# Patient Record
Sex: FEMALE | Race: WHITE | Employment: STUDENT | URBAN - METROPOLITAN AREA
[De-identification: names, ages, dates, MRNs, and addresses within clinical notes are randomized per-mention and may not be internally consistent; named-entity substitution may affect disease eponyms.]

---

## 2020-09-28 ENCOUNTER — HOSPITAL ENCOUNTER (OUTPATIENT)
Dept: PHYSICAL THERAPY | Age: 20
Discharge: HOME | End: 2020-09-28
Payer: COMMERCIAL

## 2020-09-28 PROCEDURE — 97110 THERAPEUTIC EXERCISES: CPT

## 2020-09-28 PROCEDURE — 97161 PT EVAL LOW COMPLEX 20 MIN: CPT

## 2020-09-28 NOTE — PROGRESS NOTES
Kike Conley  : 2000  Primary: [facility]*  Secondary:  [facility]  [facility] [address], [place] [address]  [phone]   [phone]      OUTPATIENT PHYSICAL THERAPY: Daily Treatment Note 2020   Visit Count:  1  ICD-10: Treatment Diagnosis: R10.2 Pelvic and perineal pain, R27.8 Lack of coordination (muscle incoordination)  Precautions/Allergies:   Patient has no allergy information on record. TREATMENT PLAN:  Effective Dates: 2020 TO 2020 (90 days). Frequency/Duration: 1 time a week for 90 Day(s) MEDICAL/REFERRING DIAGNOSIS:  Unspecified dyspareunia [N94.10]  Pelvic floor dysfunction in female [M62.89]   DATE OF ONSET: chronic  REFERRING PHYSICIAN: Tejinder Chilel NP MD Orders: Evaluate and treat  Return MD Appointment: not scheduled     Pre-treatment Symptoms/Complaints:  PFD, pelvic pain  Pain: Initial: Pain Intensity 1: 3  Post Session:  3/10   Medications Last Reviewed:  2020  Updated Objective Findings:  See evaluation note from today   TREATMENT:   THERAPEUTIC EXERCISE: (10 minutes):  Exercises per grid below to improve mobility and coordination. Required moderate verbal and tactile cues to promote proper body breathing techniques and mm relaxation. Progressed range and repetitions as indicated. TE= therapeutic exercise, TA= therapeutic activity, MT= manual therapy, NE= neuro re-education   Date:  20 Date:   Date:     Activity/Exercise Parameters Parameters Parameters   PFM coordination PFM drops to improve coordination and ROM- 5 minutes     Diaphragmatic breathing PNS activation and PFM ROM- 5 minutes                                     Pt gives verbal consent to internal vaginal assessment/treatment without chaperon present.     Treatment/Session Summary:    · Response to Treatment:  Pt reports good understanding of plan of care, as well as prescribed home exercise program.  All questions were answered to pt's satisfaction. Pt was invited to call with any further questions or concerns. · Communication/Consultation:  None today  · Equipment provided today:  None today  · Recommendations/Intent for next treatment session: Next visit will focus on manual therapy, biofeedback, flexibility.   Total Treatment Billable Duration: Evaluation 40 minutes, treatment 10 minutes  PT Patient Time In/Time Out  Time In: 1300  Time Out: [address] Nix, PT, DPT    Future Appointments   Date Time Provider Manda Gutierrez   10/5/2020  1:00 PM Coni King, PT, DPT [facility]   10/12/2020  1:00 PM [facility]ixMisty, PT, [facility]   10/19/2020  1:00 PM NixMisty, PT, DPT [facility]   10/26/2020  1:00 PM YenxMisty, PT, DPT [facility]   11/2/2020  1:00 PM Cnoi King, PT, DPT [facility]   11/9/2020  1:00 PM Coni King, PT, DPT [facility]   11/16/2020  1:00 PM [facility] King PT, [facility]   11/30/2020  1:00 PM NixMisty, PT, DPT [facility]

## 2020-09-28 NOTE — THERAPY EVALUATION
Kike Conley  : 2000  Primary: [facility]*  Secondary:  [facility] at [facility]  [facility] [address], [place] [address]  [phone]   [phone]        OUTPATIENT PHYSICAL THERAPY:Initial Assessment 2020   ICD-10: Treatment Diagnosis: R10.2 Pelvic and perineal pain, R27.8 Lack of coordination (muscle incoordination)  Precautions/Allergies:   Patient has no allergy information on record. TREATMENT PLAN:  Effective Dates: 2020 TO 2020 (90 days). Frequency/Duration: 1 time a week for 90 Day(s) MEDICAL/REFERRING DIAGNOSIS:  Unspecified dyspareunia [N94.10]  Pelvic floor dysfunction in female [M62.89]   DATE OF ONSET: chronic  REFERRING PHYSICIAN: Tejinder Chilel NP MD Orders: Evaluate and treat  Return MD Appointment: not scheduled     INITIAL ASSESSMENT:  Ms. Tanja Cameron presents with significant pelvic floor muscle (PFM) over activity and lack of coordination, resulting in pain with intercourse, tampon use and speculum examinations. Concordant pain with palpation of PFM at all layers, with improvements in pain intensity with strain/counterstrain techniques. I believe she will benefit from skilled PT, with an emphasis on manual therapy and muscle retraining, coordination and sexual health education to decreased pain and improve her ability to participate in intercourse and GYN exams. She is pleasant, motivated and eager to improve. PROBLEM LIST (Impacting functional limitations):  1. Increased Pain  2. Decreased Flexibility/Joint Mobility  3. Decreased [place] with Home Exercise Program  4. Decreased timing, coordination and awareness INTERVENTIONS PLANNED: (Treatment may consist of any combination of the following)  1. Neuromuscular re-education  2. Biofeedback as needed  3. HEP  4. Bladder retraining  5. Bladder education  6. Heat  7. Home Exercise Program (HEP)  8. Manual Therapy  9.  Neuromuscular Re-education/Strengthening  10. Therapeutic Activites  11. Therapeutic Exercise/Strengthening     GOALS: (Goals have been discussed and agreed upon with patient.)  Short-Term Functional Goals: Time Frame: 6 weeks  1. Pt will demonstrate I with basic PFM HEP to improve awareness, coordination, and timing of PFM. 2. Pt with demonstrate normal voluntary relaxation of the pelvic floor muscle group to improve pelvic floor ROM and tolerate pain free PFM examination. 3. Pt will demonstrate ability to perform diaphragmatic breathing in multiple positions to assist in pelvic floor tension reduction  Discharge Goals: Time Frame: 12 weeks  1. Pt will be independent in a home dilator and/or graded exposure program, to be performed daily, in order to reduce pain and improved tolerance of tampon use, gynecological screening, and/or sexual intercourse. 2. Pt will reports decrease pain intensity by 50% with intercourse. 3. Pt will demonstrate independence with a home exercise program for aerobic conditioning, core stabilization, and general mobility for independent management of symptoms. OUTCOME MEASURE:   Tool Used: Pelvic Floor Impact Questionnaire--short form 7 (PFIQ-7)   Score:  Initial: 9/28/20  · Bladder or Urine: 0/100  · Bowel or Rectum: 0/100  · Vagina or Pelvis: 29/100 Most Recent: X (Date: -- )  · Bladder or Urine: X  · Bowel or Rectum: X  · Vagina or Pelvis: X   Interpretation of Score: Each of the 7 sections is scored on a scale from 0-3; 0 representing \"Not at all\", 3 representing \"Quite a bit\". The mean value is taken from all the answered items, then multiplied by 100 to obtain the scale score, ranging from 0-100. This process is repeated for each column representing bowel, bladder, and pelvic pain. MEDICAL NECESSITY:   · Patient is expected to demonstrate progress in range of motion, coordination and functional technique to decrease pain.   REASON FOR SERVICES/OTHER COMMENTS:  · Patient continues to require skilled intervention due to above mentioned deficits. Total Duration:  PT Patient Time In/Time Out  Time In: 1300  Time Out: 1400    Rehabilitation Potential For Stated Goals: Good  Regarding Lisseth Wilson's therapy, I certify that the treatment plan above will be carried out by a therapist or under their direction. Thank you for this referral,  Sam Morales, PT, DPT     Referring Physician Signature: Justen Worrell NP No Signature is Required for this note. PAIN/SUBJECTIVE:   Initial: Pain Intensity 1: 3  Post Session:  3/10   HISTORY:   History of Injury/Illness (Reason for Referral):  Ms. Janna Gallegos is a 22 yo female referred to pelvic floor physical therapy (PFPT) by Justen Worrell NP 2/2 dyspareunia and pelvic floor muscle dysfunction. Pt reports that symptoms began at the age of 25. She reports a history of pain with intercourse, tampon use and speculum exams. She initially attributed pain to anxiety. She states that she has been with her current partner for some time and continues to have pain. She does have an IUD and was concerned that this might be the source of her pain. She states that she had this checked by her GYN and had STI testing as well. She also has a history of vaginal bleeding, which she states still has not been figured out. She has never had PFPT for this. Urinary: Frequency 4-5 x/day, 0-1 x/night. Negative for stress urinary incontinence, urge urinary incontinence, urinary urgency, urinary frequency, incomplete emptying, urinary hesitancy/intermittency, dysuria, hematuria, nocturia and nocturnal enuresis. Pt does not use pads for urinary protection; 0 pads per day (PPD). Fluid intake: 60 oz water/day; bladder irritants include: 1-2c coffee. Pt does not limit fluid intake due to bladder control. Bowel: Frequency 1x/day. Negative for pain with bowel movement, pushing/straining with bowel movement, fecal incontinence, constipation and incomplete emptying.  Pt does not use pads for bowel protection; 0 pads per day (PPD). Alexander stool type: 4. Use of stool softeners or laxatives? NO  Sexual: Pt is sexually active with male partners. She has been with the same partner for a while. Positive for dyspareunia. Pain is superficial and deep. Contraception/birth control: Mirena IUD. Pain improves slightly during intercourse. She does not use lubrication. She does not find that positioning changes her pain. Pelvic Organ Prolapse/Pelvic Pain: Location: vaginal. Worse with intercourse, tampon use, GYN/speculum examinations. Relieved with \"slow down\" during intercourse. Max pain 8/10. Min pain 3/10. OB History: Number of pregnancies: 0 Number of vaginal deliveries: 0 Number of c-sections: 0. Past Medical History/Comorbidities:   Ms. Janna Gallegos  has no past medical history on file. Ms. Janna Gallegos  has no past surgical history on file. Social History/Living Environment:   Pt lives with 2 roommates. Alcohol use? Yes; social, 3-4 drinks/week  Tobacco use? No  Sexual abuse? No  Prior Level of Function/Work/Activity:  Prior level of function: Independent; chronic dyspareunia  Occupation: Student @ [address] [place] [address] [address] activities: Cardio 4-5x/week, elliptical      Ambulatory/Rehab Services H2 Model Falls Risk Assessment   Risk Factors:       No Risk Factors Identified Ability to Rise from Chair:       (0)  Ability to rise in a single movement   Falls Prevention Plan:       No modifications necessary   Total: (5 or greater = High Risk): 0   ©2010 [facility] [employer]. All Rights Reserved. [facility] Patent #0,[id number].  Federal Law prohibits the replication, distribution or use without written permission from [employer]   Current Medications:     - Mirena IDU   Date Last Reviewed: 9/28/2020   Number of Personal Factors/Comorbidities that affect the Plan of Care: 0: LOW COMPLEXITY   EXAMINATION:   External Observations:   Voluntary contraction: [] absent     [x] present   Involuntary contraction: [] absent     [x] present   Involuntary relaxation: [] absent     [x] present   Perineal descent at rest: [x] absent     [] present   Perineal descent with bearing: [x] absent     [] present   Skin integrity: WNL    Pelvic Floor Muscle (PFM) Assessment:   Vaginal vault size: [] decreased     [] increased     [x] WNL   Muscle volume: [] decreased     [x] WNL     [] Defect   PFM tension: [] decreased     [x] increased     [] WNL    Contraction ability:  Voluntary contraction: [] absent     [x] weak     [] moderate      [] strong  Voluntary relaxation: [] absent     [x] partial     [] complete   MMT: 1/5   PFM endurance: not tested today   Repetitions of endurance contractions: not tested today  Number of quick contractions in 10 seconds: not tested today  Quality of contractions: limited 2/2 PFM ROM and overactivity  Overflow: [] absent     [x] min     [] mod     [] severe    Tissue laxity test:  Anterior wall: [] minimum     [] moderate     [] severe      [x] WNL  Posterior wall: [] minimum     [] moderate     [] severe      [x] WNL    Palpation: via vaginal canal ; pain improved with S/CS; pain with transitional movement  Superficial Pelvic Floor Musculature (PFM): Tender? Intermediate PFM Tender? Deep PFM Tender?    Superficial Transverse Perineal [x] Right  [x] Left  []N/T Deep Transverse Perineal [x] Right  [x] Left  []N/T Puborectalis [] Right  [x] Left  []N/T   Ischiocavernosus [x] Right  [x] Left  []N/T Compressor Urethra [] Right  [] Left  []N/T Pubococcygeus [] Right  [x] Left  []N/T   Bulbocavernosus [] Right  [] Left  []N/T   Ileococcygeus [] Right  [x] Left  []N/T       Obturator Internus [] Right  [x] Left  []N/T       Coccygeus [] Right  [] Left  [x]N/T     Breath assessment:  Observation: A/P chest expansion  Coordination of pelvic floor muscles with breath: minimal pelvic floor muscle excursion    Special tests/Movement screens:   Q-tip vulvar test: 3/10 pain at 12 o'clock, 2 o'clock and 10 o'clock     Body Structures Involved:  1. Nerves  2. Muscles Body Functions Affected:  1. Sensory/Pain  2. Genitourinary  3. Neuromusculoskeletal  4. Movement Related Activities and Participation Affected:  1. Learning and Applying Knowledge  2. General Tasks and Demands  3. Mobility  4. Self Care  5.  Interpersonal Interactions and Relationships   Number of elements (examined above) that affect the Plan of Care: 1-2: LOW COMPLEXITY   CLINICAL PRESENTATION:   Presentation: Stable and uncomplicated: LOW COMPLEXITY   CLINICAL DECISION MAKING:   Use of outcome tool(s) and clinical judgement create a POC that gives a: Clear prediction of patient's progress: LOW COMPLEXITY

## 2020-10-05 ENCOUNTER — HOSPITAL ENCOUNTER (OUTPATIENT)
Dept: PHYSICAL THERAPY | Age: 20
Discharge: HOME | End: 2020-10-05
Payer: COMMERCIAL

## 2020-10-05 PROCEDURE — 97140 MANUAL THERAPY 1/> REGIONS: CPT

## 2020-10-05 PROCEDURE — 97110 THERAPEUTIC EXERCISES: CPT

## 2020-10-05 NOTE — PROGRESS NOTES
Yonny Blake  : 2000  Primary: [facility]  Secondary:  Therapy Center at [facility]  [address] , [address], [place]  [phone]   [phone]      OUTPATIENT PHYSICAL THERAPY: Daily Treatment Note 10/5/2020   Visit Count:  2  ICD-10: Treatment Diagnosis: R10.2 Pelvic and perineal pain, R27.8 Lack of coordination (muscle incoordination)  Precautions/Allergies:   Patient has no allergy information on record. TREATMENT PLAN:  Effective Dates: 2020 TO 2020 (90 days). Frequency/Duration: 1 time a week for 90 Day(s) MEDICAL/REFERRING DIAGNOSIS:  Unspecified dyspareunia [N94.10]  Pelvic floor dysfunction in female [M62.89]   DATE OF ONSET: chronic  REFERRING PHYSICIAN: Juan Carlos Logan NP MD Orders: Evaluate and treat  Return MD Appointment: not scheduled     Pre-treatment Symptoms/Complaints:  Feels that the relaxation is getting easier. If having some burning today. Pt broke/fractured her ankle this weekend. She was seen in the ER and will follow up with ortho tomorrow. Pain: Initial: Pain Intensity 1: 2  Post Session:  -3/10   Medications Last Reviewed:  10/5/2020  Updated Objective Findings:  None Today   TREATMENT:   THERAPEUTIC EXERCISE: (10 minutes):  Exercises per grid below to improve mobility and coordination. Required moderate verbal and tactile cues to promote proper body breathing techniques and mm relaxation. Progressed range and repetitions as indicated. TE= therapeutic exercise, TA= therapeutic activity, MT= manual therapy, NE= neuro re-education   Date:  20 Date:  10/5/20 Date:     Activity/Exercise Parameters Parameters Parameters   PFM coordination PFM drops to improve coordination and ROM- 5 minutes PFM drops w/ manual palpation    Diaphragmatic breathing PNS activation and PFM ROM- 5 minutes     Happy baby  3 minutes    Adductor stretch  3 minutes                        MANUAL THERAPY: (30 minutes):  Soft tissue mobilization was utilized and necessary because of the patient's painful spasm and restricted motion of soft tissue. Date Type Location Comments   10/5/2020 Internal assessment/treatment Via vaginal canal Single digit at all PFM layers, S/CS to decreased pain    STM adductors Skin rolling    STM Ischiorectal fossa Inferior glide                           (Used abbreviations: MET - muscle energy technique; SCS- Strain counter strain; CTM-Connective tissue mobilizations; CR- Contract/relax; SP- Sustained pressure, TrP-Trigger point release, IASTM- Instrument assisted soft tissue mobilizations, TDN-Trigger point dry needling)    Pt gives verbal consent to internal vaginal assessment/treatment without chaperon present. Treatment/Session Summary:    · Response to Treatment:  Pt with increased tenderness and burning sensation at all superficial mm. She does report a decrease in symptoms w/ S/CS, but not complete relief of symptoms. Reports decreased tenderness along L LA today. Tolerated transitional movement better today with gentle SP during movement. Pt was educated in perineal/superifical stretch to complete as part of HEP, with the addition of happy baby and adductor stretch. · Communication/Consultation:  None today  · Equipment provided today:  None today  · Recommendations/Intent for next treatment session: Next visit will focus on manual therapy, biofeedback, flexibility.   Total Treatment Billable Duration: 10 minutes TE, 30 minutes MT  PT Patient Time In/Time Out  Time In: 7835  Time Out: Yeimi Messina 7620, PT, DPT    Future Appointments   Date Time Provider Manda Gutierrez   10/12/2020  1:00 PM Coni King, PT, DPT [facility]   10/19/2020  1:00 PM Coni King PT, DPT [facility]   10/26/2020  1:00 PM Coni King, PT, DPT [facility]   11/2/2020  1:00 PM Coni King, PT, DPT [facility]   11/9/2020  1:00 PM [facility], [facility] 11/16/2020  1:00 PM Preeti Farah PT, HIMANSHU FRANCOIS [facility]   11/30/2020  1:00 PM Preeti Farah PT, HIMANSHU FRANCOIS [facility]

## 2020-11-02 ENCOUNTER — HOSPITAL ENCOUNTER (OUTPATIENT)
Dept: PHYSICAL THERAPY | Age: 20
Discharge: HOME | End: 2020-11-02
Payer: COMMERCIAL

## 2020-11-02 PROCEDURE — 97110 THERAPEUTIC EXERCISES: CPT

## 2020-11-02 PROCEDURE — 97140 MANUAL THERAPY 1/> REGIONS: CPT

## 2020-11-02 NOTE — PROGRESS NOTES
Jf Hatch  : 2000  Primary: [facility]*  Secondary:  Therapy Center at [facility]  [address] , [address], [place] [address]  [phone]   [phone]      OUTPATIENT PHYSICAL THERAPY: Daily Treatment Note 2020   Visit Count:  3  ICD-10: Treatment Diagnosis: R10.2 Pelvic and perineal pain, R27.8 Lack of coordination (muscle incoordination)  Precautions/Allergies:   Patient has no allergy information on record. TREATMENT PLAN:  Effective Dates: 2020 TO 2020 (90 days). Frequency/Duration: 1 time a week for 90 Day(s) MEDICAL/REFERRING DIAGNOSIS:  Unspecified dyspareunia [N94.10]  Pelvic floor dysfunction in female [M62.89]   DATE OF ONSET: chronic  REFERRING PHYSICIAN: Candy Pichardo NP MD Orders: Evaluate and treat  Return MD Appointment: not scheduled     Pre-treatment Symptoms/Complaints:  Pt has not been seen in a little less than 1 month due to ankle injury. She had surgery for this and is recovering well. She presents to PT today ambulating in walking boot. She has been doing PFM exercises occasionally, but not consistently since last visit. She reports intercourse about 2-3 weeks ago, mild pain present. She continues to notices burning sensation randomly throughout the day. She does note that conscious, active relaxation is helpful to reduce symptoms. Pain: Initial: Pain Intensity 1: 4  Post Session:  3-4/10   Medications Last Reviewed:  2020  Updated Objective Findings:  pt with increased vulvar burning on R side with moderate pressure over the ischial tuberosity and in distripution of pudendal n (clitoral and perineal branches)   TREATMENT:   THERAPEUTIC EXERCISE: (10 minutes):  Exercises per grid below to improve mobility and coordination. Required moderate verbal and tactile cues to promote proper body breathing techniques and mm relaxation. Progressed range and repetitions as indicated.   TE= therapeutic exercise, TA= therapeutic activity, MT= manual therapy, NE= neuro re-education   Date:  9/28/20 Date:  10/5/20 Date:  11/2/20   Activity/Exercise Parameters Parameters Parameters   PFM coordination PFM drops to improve coordination and ROM- 5 minutes PFM drops w/ manual palpation    Diaphragmatic breathing PNS activation and PFM ROM- 5 minutes     Happy baby  3 minutes reviewed   Adductor stretch  3 minutes reviewed   Cat/cow   reviewed   Child's pose   reviewed   HEP   Drops 3x10, DB and flexibility 2x/day     MANUAL THERAPY: (45 minutes): Soft tissue mobilization was utilized and necessary because of the patient's painful spasm and restricted motion of soft tissue. Date Type Location Comments   11/2/2020 Internal assessment/treatment Via vaginal canal Single digit at all PFM layers, S/CS to decreased pain    STM adductors Skin rolling, myofascial     STM Ischiorectal fossa Glides in all directions                           (Used abbreviations: MET - muscle energy technique; SCS- Strain counter strain; CTM-Connective tissue mobilizations; CR- Contract/relax; SP- Sustained pressure, TrP-Trigger point release, IASTM- Instrument assisted soft tissue mobilizations, TDN-Trigger point dry needling)    Pt gives verbal consent to internal vaginal assessment/treatment without chaperon present. Treatment/Session Summary:    · Response to Treatment: Pt with vulvar burning with palpation along R pudendal nerve distribution. Improved with neuro mobilization and manual nerve glides. She reports reduction of intensity with S/CS, however not complete resolution to pain. With internal palpation, she notes concordant burning sensation that she feeling intermittently during the day. Increase in burning upon exiting of palpation. Limited ROM 2/2 overactivity likely contributing to increased burning sensation. She does have improved PFM tone and tension post treatment.  Pt encouraged to stay consistent with HEP  · Communication/Consultation: None today  · Equipment provided today:  None today  · Recommendations/Intent for next treatment session: Next visit will focus on manual therapy, biofeedback, flexibility.   Total Treatment Billable Duration: 10 minutes TE, 45 minutes MT  PT Patient Time In/Time Out  Time In: 1300  Time Out: [address] Nix, PT, DPT    Future Appointments   Date Time Provider Manda Gutierrez   11/9/2020  1:00 PM Antonette Comfort, PT, DPT [facility]   11/16/2020  1:00 PM Nix, Preeti Wright, PT, DPT [facility]   11/30/2020  1:00 PM NixPreeti, PT, DPT [facility]   12/9/2020  2:00 PM Antonette Comfort, PT, DPT [facility]   12/14/2020  1:00 PM [facility]ette [facility]fort[facility]   12/21/2020  1:00 PM Antonette Comfort, PT, [facility]   1/4/2021  1:00 PM NixPreeti, PT, DPT [facility]

## 2020-11-09 ENCOUNTER — HOSPITAL ENCOUNTER (OUTPATIENT)
Dept: PHYSICAL THERAPY | Age: 20
Discharge: HOME | End: 2020-11-09
Payer: COMMERCIAL

## 2020-11-09 PROCEDURE — 97140 MANUAL THERAPY 1/> REGIONS: CPT

## 2020-11-09 PROCEDURE — 97110 THERAPEUTIC EXERCISES: CPT

## 2020-11-16 ENCOUNTER — HOSPITAL ENCOUNTER (OUTPATIENT)
Dept: PHYSICAL THERAPY | Age: 20
Discharge: HOME | End: 2020-11-16
Payer: COMMERCIAL

## 2020-11-16 PROCEDURE — 97140 MANUAL THERAPY 1/> REGIONS: CPT

## 2020-11-16 NOTE — PROGRESS NOTES
Aletha Montenegro  : 2000  Primary: [facility]*  Secondary:  [facility] at [facility]  [address] , [address], [place] [address]  [phone]   [phone]      OUTPATIENT PHYSICAL THERAPY: Daily Treatment Note 2020   Visit Count:  5  ICD-10: Treatment Diagnosis: R10.2 Pelvic and perineal pain, R27.8 Lack of coordination (muscle incoordination)  Precautions/Allergies:   Patient has no allergy information on record. TREATMENT PLAN:  Effective Dates: 2020 TO 2020 (90 days). Frequency/Duration: 1 time a week for 90 Day(s) MEDICAL/REFERRING DIAGNOSIS:  Unspecified dyspareunia [N94.10]  Pelvic floor dysfunction in female [M62.89]   DATE OF ONSET: chronic  REFERRING PHYSICIAN: Krystal Mayo NP MD Orders: Evaluate and treat  Return MD Appointment: not scheduled     Pre-treatment Symptoms/Complaints:  Pt reports improvement with reduction in reps and increasing frequency of drops throughout the day. Not feeling that \"spasm feeling as much\". Pt reports that she is noticing pain less; she is able to use relaxation strategies when pain begins to come on to help modify. She continue to have spontaneous vaginal bleeding, started again today. Pain: Initial: Pain Intensity 1: 3  Post Session:  1/10   Medications Last Reviewed:  2020  Updated Objective Findings:  L STP and DTP with increased burning and tenderness, mild- moderate improved with S/CS, pain does not resolve; tolerated full penetration of #1 amielle dilator with mild discomfort and mild mm restrictions   TREATMENT:   THERAPEUTIC EXERCISE: (0 minutes):  Exercises per grid below to improve mobility and coordination. Required moderate verbal and tactile cues to promote proper body breathing techniques and mm relaxation, reduce burning. Progressed range and repetitions as indicated.   TE= therapeutic exercise, TA= therapeutic activity, MT= manual therapy, NE= neuro re-education Date:  9/28/20 Date:  10/5/20 Date:  11/2/20 Date:  11/9/20 Date:  11/16/20   Activity/Exercise Parameters Parameters Parameters     PFM coordination PFM drops to improve coordination and ROM- 5 minutes PFM drops w/ manual palpation  Drops w/ manual therapy to reduce mm tension    Diaphragmatic breathing PNS activation and PFM ROM- 5 minutes   DB w/ manual therapy in order to reduce tension and improve ROM    Happy baby  3 minutes reviewed     Adductor stretch  3 minutes reviewed     Cat/cow   reviewed     Child's pose   reviewed     Patient education    Role and benefits for dilators; information for review    HEP   Drops 3x10, DB and flexibility 2x/day       MANUAL THERAPY: (55 minutes): Soft tissue mobilization was utilized and necessary because of the patient's painful spasm and restricted motion of soft tissue. Date Type Location Comments   11/16/2020 Internal assessment/treatment Via vaginal canal Single digit at all PFM layers, PIT tranverse perineal mm; #1 Amielle dilator to full penetration    STM adductors Skin rolling, myofascial     STM Ischiorectal fossa anterior and posterior Glides in all directions; increased tenderness along inferior rectal branch, improved with reps   No today STM vulva UG Diaphragm mobilization                     (Used abbreviations: MET - muscle energy technique; SCS- Strain counter strain; CTM-Connective tissue mobilizations; CR- Contract/relax; SP- Sustained pressure, TrP-Trigger point release, IASTM- Instrument assisted soft tissue mobilizations, TDN-Trigger point dry needling)    Pt gives verbal consent to internal vaginal assessment/treatment without chaperon present. Treatment/Session Summary:    · Response to Treatment: Pt with increased vaginal burning with palpation of L superficial and middle layers today. She does have improvements with use of positional inhibition techniques, however pain is not completely resolved.  There is slightly less soft tissue movement on L side as compared to R. She tolerated #1 Amielle dilator well with mild discomfort and muscle restrictions. She has a mild-moderate vaginal bleeding today. I wonder if this could be contributing to overactivity of PFM if uterus is frequently jacquelin during spontaneous bleeding. · Communication/Consultation:  None today  · Equipment provided today:  None today  · Recommendations/Intent for next treatment session: Next visit will focus on manual therapy, biofeedback, flexibility.   Total Treatment Billable Duration: 55 minutes MT  PT Patient Time In/Time Out  Time In: 1700  Time Out: 1800  Jania Goldberg PT, DPT    Future Appointments   Date Time Provider Manda Gutierrez   11/30/2020  1:00 PM Jose Stovall DPT [facility]   12/9/2020  2:00 PM Gertrudis Mcrae PT, DPT [facility]   12/14/2020  1:00 PM Sagar Farah PT, DPT [facility]   12/21/2020  1:00 PM Gertrudis Mcrae PT DPT [facility]   1/4/2021  1:00 PM [facility]

## 2020-12-07 ENCOUNTER — HOSPITAL ENCOUNTER (OUTPATIENT)
Dept: PHYSICAL THERAPY | Age: 20
Discharge: HOME | End: 2020-12-07
Payer: COMMERCIAL

## 2020-12-07 PROCEDURE — 97110 THERAPEUTIC EXERCISES: CPT

## 2020-12-07 PROCEDURE — 97140 MANUAL THERAPY 1/> REGIONS: CPT

## 2020-12-07 NOTE — PROGRESS NOTES
Nereida Centeno  : 2000  Primary: [facility]*  Secondary:  [facility]  [address] , [address], [place] [address]  [phone]   [phone]      OUTPATIENT PHYSICAL THERAPY: Daily Treatment Note 2020   Visit Count:  6  ICD-10: Treatment Diagnosis: R10.2 Pelvic and perineal pain, R27.8 Lack of coordination (muscle incoordination)  Precautions/Allergies:   Patient has no allergy information on record. TREATMENT PLAN:  Effective Dates: 2020 TO 2020 (90 days). Frequency/Duration: 1 time a week for 90 Day(s) MEDICAL/REFERRING DIAGNOSIS:  Unspecified dyspareunia [N94.10]  Pelvic floor dysfunction in female [M62.89]   DATE OF ONSET: chronic  REFERRING PHYSICIAN: Amalia Washington NP MD Orders: Evaluate and treat  Return MD Appointment: not scheduled     Pre-treatment Symptoms/Complaints:  Pt doing well overall. Feels that she is experiencing less frequency muscle spasms in the vaginal area. Feels good about know how to modify pain when it arises. Has not purchased dilators yet, but plans to soon. Pain: Initial: Pain Intensity 1: 0  Post Session:  0/10   Medications Last Reviewed:  2020  Updated Objective Findings:  reduced hip ER B, increased hip IR B; 2nd vaginismus dilator to full penetration with mild discomfort, increased pain noted at dip of dilator with full penetration   TREATMENT:   THERAPEUTIC EXERCISE: (10 minutes):  Exercises per grid below to improve mobility and coordination. Required minimal visual and verbal cues to promote proper body posture and promote proper body breathing techniques. Progressed range and repetitions as indicated.   TE= therapeutic exercise, TA= therapeutic activity, MT= manual therapy, NE= neuro re-education   Date:  20 Date:  10/5/20 Date:  20 Date:  20 Date:  20 Date:  20   Activity/Exercise Parameters Parameters Parameters      PFM coordination PFM drops to improve coordination and ROM- 5 minutes PFM drops w/ manual palpation  Drops w/ manual therapy to reduce mm tension     Diaphragmatic breathing PNS activation and PFM ROM- 5 minutes   DB w/ manual therapy in order to reduce tension and improve ROM     Happy baby  3 minutes reviewed      Adductor stretch  3 minutes reviewed      Cat/cow   reviewed      Child's pose   reviewed      Piriformis stretch      3x30s B   Hamstring stretch      3x30s   Patient education    Role and benefits for dilators; information for review     HEP   Drops 3x10, DB and flexibility 2x/day   Add finger stretch every other day, 5 minutes; continue with stretching, drops and DB     MANUAL THERAPY: (45 minutes): Soft tissue mobilization was utilized and necessary because of the patient's painful spasm and restricted motion of soft tissue. Date Type Location Comments   12/7/2020 Internal assessment/treatment Via vaginal canal Single digit at all PFM layers, PIT tranverse perineal mm; #2 Vaginismus dilator to full penetration    STM adductors Skin rolling, myofascial     STM Ischiorectal fossa anterior and posterior Glides in all directions; increased tenderness along inferior rectal branch, improved with reps   No today STM vulva UG Diaphragm mobilization                     (Used abbreviations: MET - muscle energy technique; SCS- Strain counter strain; CTM-Connective tissue mobilizations; CR- Contract/relax; SP- Sustained pressure, TrP-Trigger point release, IASTM- Instrument assisted soft tissue mobilizations, TDN-Trigger point dry needling)    Pt gives verbal consent to internal vaginal assessment/treatment without chaperon present. Treatment/Session Summary:    · Response to Treatment: Pt tolerated progression to #2 vaginismus dilator well with mild discomfort noted during transitions and with deepest penetration at the end of the dilator.  She continues to have mild vaginal bleeding, which I question if this is contributing to pain symptoms and overactivity of muscles. \"Burning\" sensation noted at L levator ani improved with S/CS indicating likely muscle dysfunction contributing to persistent pain. She demonstrates mild limitations with B hip ER; states that she often feels tight in the hips. We discussed on tight hip musculature can contribute to over activity of PFM; initiated piriformis and hamstring stretches today. Pt encouraged to continue with stretching 2x/day. Denies pain post treatment. · Communication/Consultation:  None today  · Equipment provided today:  None today  · Recommendations/Intent for next treatment session: Next visit will focus on manual therapy, biofeedback, flexibility.   Total Treatment Billable Duration: 45 minutes MT, 10 minutes TE  PT Patient Time In/Time Out  Time In: 1500  Time Out: [address] [facility], PT, DPT    Future Appointments   Date Time Provider Manda Gutierrez   12/14/2020  1:00 PM Linda Salazar, PT, DPT [facility]   12/21/2020  1:00 PM Linda Salazar, PT, DPT [facility]   1/4/2021  1:00 PM Darin Farah, PT, DPT [facility]

## 2020-12-09 ENCOUNTER — APPOINTMENT (OUTPATIENT)
Dept: PHYSICAL THERAPY | Age: 20
End: 2020-12-09
Payer: COMMERCIAL

## 2020-12-14 ENCOUNTER — HOSPITAL ENCOUNTER (OUTPATIENT)
Dept: PHYSICAL THERAPY | Age: 20
Discharge: HOME | End: 2020-12-14
Payer: COMMERCIAL

## 2020-12-14 PROCEDURE — 97140 MANUAL THERAPY 1/> REGIONS: CPT

## 2020-12-14 PROCEDURE — 97110 THERAPEUTIC EXERCISES: CPT

## 2020-12-14 NOTE — PROGRESS NOTES
Anjum Dick  : 2000  Primary: Kiah [facility]orne [facility]*  Secondary:  [facility]  [address] , [address], [place] [address]  [phone]   [phone]      OUTPATIENT PHYSICAL THERAPY: Daily Treatment Note 2020   Visit Count:  7  ICD-10: Treatment Diagnosis: R10.2 Pelvic and perineal pain, R27.8 Lack of coordination (muscle incoordination)  Precautions/Allergies:   Patient has no allergy information on record. TREATMENT PLAN:  Effective Dates: 2020 TO 2020 (90 days). Frequency/Duration: 1 time a week for 90 Day(s) MEDICAL/REFERRING DIAGNOSIS:  Unspecified dyspareunia [N94.10]  Pelvic floor dysfunction in female [M62.89]   DATE OF ONSET: chronic  REFERRING PHYSICIAN: Fina Saenz NP MD Orders: Evaluate and treat  Return MD Appointment: not scheduled     Pre-treatment Symptoms/Complaints:  Pt doing well overall. Feels that she is experiencing less frequency muscle spasms in the vaginal area. Feels good about know how to modify pain when it arises. Has not purchased dilators yet, but plans to soon. Pain: Initial: Pain Intensity 1: 4  Pain Location 1: Vulva -5/10 Post Session:  2/10   Medications Last Reviewed:  2020  Updated Objective Findings:  increased pain and burning with digital palpation of superficial layer; increased discomfort noted with movement of dilators   TREATMENT:   THERAPEUTIC EXERCISE: (10 minutes):  Exercises per grid below to improve mobility and coordination. Required minimal visual and verbal cues to promote proper body posture and promote proper body breathing techniques. Progressed range and repetitions as indicated.   TE= therapeutic exercise, TA= therapeutic activity, MT= manual therapy, NE= neuro re-education   Date:  10/5/20 Date:  20 Date:  20 Date:  20 Date:  20 Date:  20   Activity/Exercise Parameters Parameters       PFM coordination PFM drops w/ manual palpation Drops w/ manual therapy to reduce mm tension   Drops with manual palpation to reduce mm tension and pain   Diaphragmatic breathing   DB w/ manual therapy in order to reduce tension and improve ROM   5 minutes of DB w/ dilator inserted for PFM relaxation   Happy baby 3 minutes reviewed       Adductor stretch 3 minutes reviewed       Cat/cow  reviewed       Child's pose  reviewed       Piriformis stretch     3x30s B    Hamstring stretch     3x30s    Patient education   Role and benefits for dilators; information for review      HEP  Drops 3x10, DB and flexibility 2x/day   Add finger stretch every other day, 5 minutes; continue with stretching, drops and DB      MANUAL THERAPY: (45 minutes): Soft tissue mobilization was utilized and necessary because of the patient's painful spasm and restricted motion of soft tissue. Date Type Location Comments   12/14/2020 Internal assessment/treatment Via vaginal canal Single digit at all PFM layers, PIT tranverse perineal mm; #2 Vaginismus dilator to full penetration    STM adductors Skin rolling, myofascial     STM Ischiorectal fossa anterior and posterior Glides in all directions; increased tenderness along inferior rectal branch, improved with reps   No today STM vulva UG Diaphragm mobilization                     (Used abbreviations: MET - muscle energy technique; SCS- Strain counter strain; CTM-Connective tissue mobilizations; CR- Contract/relax; SP- Sustained pressure, TrP-Trigger point release, IASTM- Instrument assisted soft tissue mobilizations, TDN-Trigger point dry needling)    Pt gives verbal consent to internal vaginal assessment/treatment without chaperon present. Treatment/Session Summary:    · Response to Treatment: Pt with increased vulvar burning today and mm tension contributing to increased tension throughout the PFM and increased reactivity of muscle during manual therapy and dilator training.  Noted most increased in pain on the left side with with transition toward the left side. She tolerate full penetration of #2  with slightly increased discomfort but notes reduced tension after static hold with diaphragmatic breathing. Minimal burning and pain with removal of dilator. Notes reduced pain from 4-5/10 to 2/10 by end of session.   · Communication/Consultation:  consult with MD regarding CBD suppositories  · Equipment provided today:  None today  · Recommendations/Intent for next treatment session: Next visit will focus on manual therapy, biofeedback, flexibility, continue with dilator progressiong  Total Treatment Billable Duration: 45 minutes MT, 10 minutes TE  PT Patient Time In/Time Out  Time In: 1300  Time Out: [address] [facility],[address], PT, DPT    Future Appointments   Date Time Provider Manda Hernandezisti   12/21/2020  1:00 PM Hai Benitez, PT, DPT [facility]   1/4/2021  1:00 PM [facility]

## 2020-12-21 ENCOUNTER — HOSPITAL ENCOUNTER (OUTPATIENT)
Dept: PHYSICAL THERAPY | Age: 20
Discharge: HOME | End: 2020-12-21
Payer: COMMERCIAL

## 2020-12-21 PROCEDURE — 97110 THERAPEUTIC EXERCISES: CPT

## 2020-12-21 PROCEDURE — 97140 MANUAL THERAPY 1/> REGIONS: CPT

## 2020-12-21 NOTE — PROGRESS NOTES
Alis Bernardo  : 2000  Primary: Ray Newton [facility]*  Secondary:  [facility]  [facility] [address] [place] [address]  [phone]   [phone]      OUTPATIENT PHYSICAL THERAPY: Daily Treatment Note 2020   Visit Count:  8  ICD-10: Treatment Diagnosis: R10.2 Pelvic and perineal pain, R27.8 Lack of coordination (muscle incoordination)  Precautions/Allergies:   Patient has no allergy information on record. TREATMENT PLAN:  Effective Dates: 2020 TO 2020 (90 days). Frequency/Duration: 1 time a week for 90 Day(s) MEDICAL/REFERRING DIAGNOSIS:  Unspecified dyspareunia [N94.10]  Pelvic floor dysfunction in female [M62.89]   DATE OF ONSET: chronic  REFERRING PHYSICIAN: Josh Montaño NP MD Orders: Evaluate and treat  Return MD Appointment: not scheduled     Pre-treatment Symptoms/Complaints: Reports having a better week in regards to pain. She is frustrated with vaginal bleeding as this persists. Pain: Initial: Pain Intensity 1: 3  Pain Location 1: Vulva Post Session:  1/10   Medications Last Reviewed:  2020  Updated Objective Findings:  full penetration of 3rd vaginismus dilator with increased burning/tightness at deepest portion   TREATMENT:   THERAPEUTIC EXERCISE: (10 minutes):  Exercises per grid below to improve mobility and coordination. Required minimal visual and verbal cues to promote proper body posture and promote proper body breathing techniques. Progressed range and repetitions as indicated.   TE= therapeutic exercise, TA= therapeutic activity, MT= manual therapy, NE= neuro re-education   Date:  20 Date:  20 Date:  20 Date:  20 Date:  20   Activity/Exercise        PFM coordination Drops w/ manual therapy to reduce mm tension   Drops with manual palpation to reduce mm tension and pain    Diaphragmatic breathing DB w/ manual therapy in order to reduce tension and improve ROM   5 minutes of DB w/ dilator inserted for PFM relaxation    Happy baby        Adductor stretch        Cat/cow        Child's pose        Piriformis stretch   3x30s B     Hamstring stretch   3x30s     Patient education Role and benefits for dilators; information for review    Review of dilator use, frequency and duration  Tennis ball rolling of inner thigh- 10 minutes   HEP   Add finger stretch every other day, 5 minutes; continue with stretching, drops and DB       MANUAL THERAPY: (45 minutes): Soft tissue mobilization was utilized and necessary because of the patient's painful spasm and restricted motion of soft tissue. Date Type Location Comments   12/21/2020 Internal assessment/treatment Via vaginal canal Single digit at all PFM layers, PIT tranverse perineal mm; #2 & #3 Vaginismus dilator to full penetration    STM adductors Skin rolling, myofascial     STM Ischiorectal fossa anterior and posterior Glides in all directions; increased tenderness along inferior rectal branch, improved with reps   No today STM vulva UG Diaphragm mobilization                     (Used abbreviations: MET - muscle energy technique; SCS- Strain counter strain; CTM-Connective tissue mobilizations; CR- Contract/relax; SP- Sustained pressure, TrP-Trigger point release, IASTM- Instrument assisted soft tissue mobilizations, TDN-Trigger point dry needling)    Pt gives verbal consent to internal vaginal assessment/treatment without chaperon present. Treatment/Session Summary:    · Response to Treatment: Continues to have obvious muscle guarding with initial touch, which she is aware of and able to identify. She has vulvar burning and mild irritation of the labia majora, likely from pad usage due to vaginal bleeding. Progressed to #3 vaginismus dilator today with increased discomfort during deepest penetration. Notes increased burning sensation both deep and superficially with max penetration of #3 dilator.  Pt will begin to use dilators at home over the next two weeks and progress as tolerated.   · Communication/Consultation:  schedule f/u w/ MD regarding vaginal bleeding  · Equipment provided today:  None today  · Recommendations/Intent for next treatment session: Next visit will focus on manual therapy, biofeedback PRN, flexibility, continue with dilator progression, deep squat stretch  Total Treatment Billable Duration: 45 minutes MT, 10 minutes TE  PT Patient Time In/Time Out  Time In: 1300  Time Out: Yeimi Messina 1620, PT, DPT    Future Appointments   Date Time Provider Manda Gutierrez   1/4/2021  1:00 PM Samantha Caceres, PT, DPT [facility]   1/11/2021  5:00 PM Juan M Farah, PT, DPT [facility]   1/18/2021  4:00 PM Samantha Caceres PT, DPT [facility]   1/25/2021  4:00 PM Samantha Caceres PT, DPT [facility]   2/1/2021  4:00 PM Samantha Caceres PT, DPT [facility]   2/8/2021  4:00 PM Samantha Caceres PT, DPT [facility]   2/15/2021  4:00 PM Juan M Farah, PT, DPT [facility]

## 2021-01-04 ENCOUNTER — HOSPITAL ENCOUNTER (OUTPATIENT)
Dept: PHYSICAL THERAPY | Age: 21
Discharge: HOME | End: 2021-01-04
Payer: COMMERCIAL

## 2021-01-04 PROCEDURE — 97110 THERAPEUTIC EXERCISES: CPT

## 2021-01-04 PROCEDURE — 97140 MANUAL THERAPY 1/> REGIONS: CPT

## 2021-01-04 NOTE — PROGRESS NOTES
Vicki Pritchett  : 2000  Primary: [facility]*  Secondary:  [facility] at [facility]  [address] , [address], [place] [address]  [phone]   [phone]      OUTPATIENT PHYSICAL THERAPY: Daily Treatment Note 2021   Visit Count:  9  ICD-10: Treatment Diagnosis: R10.2 Pelvic and perineal pain, R27.8 Lack of coordination (muscle incoordination)  Precautions/Allergies:   Patient has no allergy information on record. TREATMENT PLAN:  Effective Dates: 2020 TO 2020 (90 days). Frequency/Duration: 1 time a week for 90 Day(s) MEDICAL/REFERRING DIAGNOSIS:  Unspecified dyspareunia [N94.10]  Pelvic floor dysfunction in female [M62.89]   DATE OF ONSET: chronic  REFERRING PHYSICIAN: Kathya Kim NP MD Orders: Evaluate and treat  Return MD Appointment: not scheduled     Pre-treatment Symptoms/Complaints: Reports worse bleeding this week. She is going to call MD for follow up. Has been using dilators the last two weeks, getting easier but still very uncomfortable with movement; using 1st Amielle dilator. Pain: Initial: Pain Intensity 1: 3 Post Session:  1/10   Medications Last Reviewed:  2021  Updated Objective Findings:  pressure noted with palpation of R PFM; burning and \"tearing\" sensation of L PFM layers 1&2   TREATMENT:   THERAPEUTIC EXERCISE: (15 minutes):  Exercises per grid below to improve mobility and coordination. Required minimal visual, verbal and manual cues to promote proper body posture and promote proper body breathing techniques. Progressed range and repetitions as indicated.   TE= therapeutic exercise, TA= therapeutic activity, MT= manual therapy, NE= neuro re-education   Date:  20 Date:  20 Date:  20 Date:  20 Date:  20 Date:  21   Activity/Exercise         PFM coordination Drops w/ manual therapy to reduce mm tension   Drops with manual palpation to reduce mm tension and pain     Diaphragmatic breathing DB w/ manual therapy in order to reduce tension and improve ROM   5 minutes of DB w/ dilator inserted for PFM relaxation  To improve PFM excursion and parasympathetic activation   Happy baby         Adductor stretch         Cat/cow         Child's pose      3-way with emphasis on elongation of L side body   Piriformis stretch   3x30s B      Hamstring stretch   3x30s      Patient education Role and benefits for dilators; information for review    Review of dilator use, frequency and duration  Tennis ball rolling of inner thigh- 10 minutes    HEP   Add finger stretch every other day, 5 minutes; continue with stretching, drops and DB   DB 5 minutes daily; add 2 back stretches   Supine twist      To R to elongation L side body     MANUAL THERAPY: (40 minutes): Soft tissue mobilization was utilized and necessary because of the patient's painful spasm and restricted motion of soft tissue. Date Type Location Comments   1/4/2021 Internal assessment/treatment Via vaginal canal Single digit at all PFM layers, PIT tranverse perineal mm    STM adductors Skin rolling, myofascial     STM Ischiorectal fossa anterior and posterior Glides in all directions; increased tenderness along inferior rectal branch, improved with reps   Not today STM vulva UG Diaphragm mobilization                     (Used abbreviations: MET - muscle energy technique; SCS- Strain counter strain; CTM-Connective tissue mobilizations; CR- Contract/relax; SP- Sustained pressure, TrP-Trigger point release, IASTM- Instrument assisted soft tissue mobilizations, TDN-Trigger point dry needling, PIT- Positional Inhibition Technique)    Pt gives verbal consent to internal vaginal assessment/treatment without chaperon present. Treatment/Session Summary:    · Response to Treatment: Pt with increased L sided PFM sensitivity today, noting burning and \"tearing\" sensation.  She does have improved relaxation and reduced pain with use of diaphragmatic breathing and contract/relax. Initiated some basic lumbar spine stretches to improve L sides mobility and flexibility as this could be creating some compression cause pain and overactivity into PFM.   · Communication/Consultation:  schedule f/u w/ MD regarding vaginal bleeding  · Equipment provided today:  None today  · Recommendations/Intent for next treatment session: Next visit will focus on manual therapy, biofeedback PRN, flexibility, continue with dilator progression, deep squat stretch  Total Treatment Billable Duration: 40 minutes MT, 15 minutes TE  PT Patient Time In/Time Out  Time In: 1300  Time Out: Yeimi Messina 1620, PT, DPT    Future Appointments   Date Time Provider Manda Gutierrez   1/11/2021  5:00 PM Nicolette Portal, PT, DPT [facility]   1/18/2021  4:00 PM Arie Farah, PT, DPT [facility]   1/25/2021  4:00 PM Nicolette Portal, PT, DPT [facility]   2/1/2021  4:00 PM Nicolette Portal, PT, DPT [facility]   2/8/2021  4:00 PM Nicolette Portal, PT, DPT [facility]   2/15/2021  4:00 PM Arie Farah, PT, DPT [facility]

## 2021-01-11 ENCOUNTER — HOSPITAL ENCOUNTER (OUTPATIENT)
Dept: PHYSICAL THERAPY | Age: 21
Discharge: HOME | End: 2021-01-11
Payer: COMMERCIAL

## 2021-01-11 PROCEDURE — 97110 THERAPEUTIC EXERCISES: CPT

## 2021-01-11 PROCEDURE — 97140 MANUAL THERAPY 1/> REGIONS: CPT

## 2021-01-11 NOTE — PROGRESS NOTES
Jena Lara  : 2000  Primary: Brayan Marquez [facility]*  Secondary:  [facility]  [address] [address] [facility] [address] [place] [address]  [phone]   [phone]      OUTPATIENT PHYSICAL THERAPY: Daily Treatment Note 2021   Visit Count:  10  ICD-10: Treatment Diagnosis: R10.2 Pelvic and perineal pain, R27.8 Lack of coordination (muscle incoordination)  Precautions/Allergies:   Patient has no allergy information on record. TREATMENT PLAN:  Effective Dates: 2020 TO 2020 (90 days). Frequency/Duration: 1 time a week for 90 Day(s) MEDICAL/REFERRING DIAGNOSIS:  Unspecified dyspareunia [N94.10]  Pelvic floor dysfunction in female [M62.89]   DATE OF ONSET: chronic  REFERRING PHYSICIAN: Victorino Scales NP MD Orders: Evaluate and treat  Return MD Appointment: not scheduled     Pre-treatment Symptoms/Complaints: Saw MD last Thursday. Started on oral progesterone (unsure of dosage). Does note decreased bleeding since starting. Has been using 2nd dilator but notes that dilator \"pushed out\" if she lets go of it. Denies pain with progression of dilator. Pain: Initial: Pain Intensity 1: 0 Post Session:  0/10   Medications Last Reviewed:  2021  Updated Objective Findings:  None Today   TREATMENT:   THERAPEUTIC EXERCISE: (10 minutes):  Exercises per grid below to improve mobility and coordination. Required minimal visual and verbal cues to promote proper body alignment and promote proper body posture. Progressed range and repetitions as indicated.   TE= therapeutic exercise, TA= therapeutic activity, MT= manual therapy, NE= neuro re-education   Date:  20 Date:  20 Date:  20 Date:  20 Date:  21 Date:  21   Activity/Exercise         PFM coordination   Drops with manual palpation to reduce mm tension and pain      Diaphragmatic breathing   5 minutes of DB w/ dilator inserted for PFM relaxation  To improve PFM excursion and parasympathetic activation W/ drops in order to improve relaxation during movement   Happy baby         Adductor stretch         Cat/cow         Child's pose     3-way with emphasis on elongation of L side body    Piriformis stretch  3x30s B       Hamstring stretch  3x30s       Patient education    Review of dilator use, frequency and duration  Tennis ball rolling of inner thigh- 10 minutes     HEP  Add finger stretch every other day, 5 minutes; continue with stretching, drops and DB   DB 5 minutes daily; add 2 back stretches    Supine twist     To R to elongation L side body    'tail wag'       x10     MANUAL THERAPY: (45 minutes): Soft tissue mobilization was utilized and necessary because of the patient's painful spasm and restricted motion of soft tissue. Date Type Location Comments   1/11/2021 Internal assessment/treatment Via vaginal canal Single digit at all PFM layers, PIT tranverse perineal mm; 1/2 dilators    STM adductors Skin rolling, myofascial     STM Ischiorectal fossa anterior and posterior Glides in all directions; increased tenderness along inferior rectal branch, improved with reps   Not today STM vulva UG Diaphragm mobilization                     (Used abbreviations: MET - muscle energy technique; SCS- Strain counter strain; CTM-Connective tissue mobilizations; CR- Contract/relax; SP- Sustained pressure, TrP-Trigger point release, IASTM- Instrument assisted soft tissue mobilizations, TDN-Trigger point dry needling, PIT- Positional Inhibition Technique)    Pt gives verbal consent to internal vaginal assessment/treatment without chaperon present. Treatment/Session Summary:    · Response to Treatment: Pt with improved PFM relaxation and soft tissue mobility today with decreased tenderness present. She does note slightly increased discomfort on the L levator ani mm group with 2nd vaginismus dilator; notes a tightening response with transition toward this side.  No noted vaginal spotting noticed to, possibly contributing to improve PFM relaxation. Additional of lateral spine stretch to improve L sided tightness.   · Communication/Consultation:  None today  · Equipment provided today:  None today  · Recommendations/Intent for next treatment session: Next visit will focus on manual therapy to lumbar spine, flexibility/spine mobility, manual therapy/dilator progression as tolerated  Total Treatment Billable Duration: 45 minutes MT, 10 minutes TE  PT Patient Time In/Time Out  Time In: 1300  Time Out: Yeimi Messina 1620, PT, DPT    Future Appointments   Date Time Provider Manda Gutierrez   1/18/2021  4:00 PM Cele Gamboa, PT, DPT [facility]   1/25/2021  4:00 PM [facility]   2/1/2021  4:00 PM Cele Gamboa, PT, DPT [facility]   2/8/2021  4:00 PM Cele Gamboa, PT[facility]   2/15/2021  4:00 PM [facility]ul Farah, [facility]

## 2021-01-18 ENCOUNTER — HOSPITAL ENCOUNTER (OUTPATIENT)
Dept: PHYSICAL THERAPY | Age: 21
Discharge: HOME | End: 2021-01-18
Payer: COMMERCIAL

## 2021-01-18 NOTE — PROGRESS NOTES
Doroteo Ponce  : 2000  Primary: Traci Toledo Of [facility]*  Secondary:  [facility] at [place] [address], [address], [place] [address]  [phone]   [phone]      OUTPATIENT DAILY NOTE    NAME/AGE/GENDER: Doroteo Ponce is a 21 y.o. female. DATE: 2021    Ms. Dobson Punch for today's appointment due to Tip Schuster exposure.     Jose Bains, PT, DPT

## 2021-01-25 ENCOUNTER — HOSPITAL ENCOUNTER (OUTPATIENT)
Dept: PHYSICAL THERAPY | Age: 21
Discharge: HOME | End: 2021-01-25
Payer: COMMERCIAL

## 2021-01-25 PROCEDURE — 97140 MANUAL THERAPY 1/> REGIONS: CPT

## 2021-01-25 PROCEDURE — 97110 THERAPEUTIC EXERCISES: CPT

## 2021-01-25 NOTE — PROGRESS NOTES
Isabel Agarwal  : 2000  Primary: Connie Sawant [facility]*  Secondary:  [facility] at [facility]  [address] , [address], [place] [address]  [phone]   [phone]      OUTPATIENT PHYSICAL THERAPY: Daily Treatment Note 2021   Visit Count:  11  ICD-10: Treatment Diagnosis: R10.2 Pelvic and perineal pain, R27.8 Lack of coordination (muscle incoordination)  Precautions/Allergies:   Patient has no allergy information on record. TREATMENT PLAN:  Effective Dates: 2020 TO 2020 (90 days). Frequency/Duration: 1 time a week for 90 Day(s) MEDICAL/REFERRING DIAGNOSIS:  Unspecified dyspareunia [N94.10]  Pelvic floor dysfunction in female [M62.89]   DATE OF ONSET: chronic  REFERRING PHYSICIAN: Anne Marie Cabrales NP MD Orders: Evaluate and treat  Return MD Appointment: not scheduled     Pre-treatment Symptoms/Complaints: Is progressing with dilators, working on transitioning to #3. States that she had one day where she was unable to tolerate #2 due to pain/tightness, she notes increase back pain this day as well. She has had increased vaginal bleeding and has contacted her GYN about this. Pain: Initial: Pain Intensity 1: 0 Post Session:  0/10   Medications Last Reviewed:  2021  Updated Objective Findings:  full penetration of #3 vaginismus dilator with increased tightness and \"discomfort\" to ~4/10   TREATMENT:   THERAPEUTIC EXERCISE: (30 minutes):  Exercises per grid below to improve mobility and coordination. Required minimal visual and verbal cues to promote proper body alignment, promote proper body posture and promote proper body breathing techniques. Progressed range and repetitions as indicated.   TE= therapeutic exercise, TA= therapeutic activity, MT= manual therapy, NE= neuro re-education   Date:  20 Date:  20 Date:  20 Date:  20 Date:  21 Date:  21 Date:  21   Activity/Exercise          PFM coordination   Drops with manual palpation to reduce mm tension and pain       Diaphragmatic breathing   5 minutes of DB w/ dilator inserted for PFM relaxation  To improve PFM excursion and parasympathetic activation W/ drops in order to improve relaxation during movement    Happy baby       3x30s   Adductor stretch          Cat/cow       W/ pelvic circles x10 B   Child's pose     3-way with emphasis on elongation of L side body  3-way with emphasis on elongation of L side body   Piriformis stretch  3x30s B        Hamstring stretch  3x30s        Patient education    Review of dilator use, frequency and duration  Tennis ball rolling of inner thigh- 10 minutes      HEP  Add finger stretch every other day, 5 minutes; continue with stretching, drops and DB   DB 5 minutes daily; add 2 back stretches     Supine twist     To R to elongation L side body     Thoracic rotation       x10 B in quadruped   'tail wag'       x10    Stepper       8 minutes     MANUAL THERAPY: (25 minutes): Soft tissue mobilization was utilized and necessary because of the patient's painful spasm and restricted motion of soft tissue. Date Type Location Comments   1/25/2021 Internal assessment/treatment Via vaginal canal Single digit at all PFM layers, PIT tranverse perineal mm; 2/3 dilators    STM adductors Skin rolling, myofascial     STM Ischiorectal fossa anterior and posterior Glides in all directions; increased tenderness along inferior rectal branch, improved with reps   Not today STM vulva UG Diaphragm mobilization                     (Used abbreviations: MET - muscle energy technique; SCS- Strain counter strain; CTM-Connective tissue mobilizations; CR- Contract/relax; SP- Sustained pressure, TrP-Trigger point release, IASTM- Instrument assisted soft tissue mobilizations, TDN-Trigger point dry needling, PIT- Positional Inhibition Technique)    Pt gives verbal consent to internal vaginal assessment/treatment without chaperon present.     Treatment/Session Summary:    · Response to Treatment: Pt is progressing well with decreased pain and improving tolerance for manual therapy. She does note increased vaginal tightness and reactivity with transition to #3 dilator today. She is able to voluntary implement breathing and drops in order to minimize tightness during dilator training. Vaginal bleeding is increased today, which she has already contacted her MD about. L sided LBP should continue to be ac focus of stretching and mobility in order to optimize PFM tone.   · Communication/Consultation:  None today  · Equipment provided today:  None today  · Recommendations/Intent for next treatment session: Next visit will focus on manual therapy to lumbar spine, flexibility/spine mobility, manual therapy/dilator progression as tolerated  Total Treatment Billable Duration: 25 minutes MT, 30 minutes TE  PT Patient Time In/Time Out  Time In: 1600  Time Out: [address], PT, DPT    Future Appointments   Date Time Provider Manda Gutierrez   2/1/2021  4:00 PM Jose Shay, [facility]   2/8/2021  4:00 PM Melba Pollack PT, DPT [facility]   2/15/2021  4:00 PM Deirdre Farah, PT, DPT [facility]

## 2021-02-01 ENCOUNTER — HOSPITAL ENCOUNTER (OUTPATIENT)
Dept: PHYSICAL THERAPY | Age: 21
Discharge: HOME | End: 2021-02-01
Payer: COMMERCIAL

## 2021-02-01 PROCEDURE — 97110 THERAPEUTIC EXERCISES: CPT

## 2021-02-01 PROCEDURE — 97140 MANUAL THERAPY 1/> REGIONS: CPT

## 2021-02-01 NOTE — PROGRESS NOTES
Sally Arias  : 2000  Primary: Mary Fernandez [facility]*  Secondary:  [facility] at [facility]  [address] , [address], [place] [address]  [phone]   [phone]      OUTPATIENT PHYSICAL THERAPY: Daily Treatment Note 2021   Visit Count:  12  ICD-10: Treatment Diagnosis: R10.2 Pelvic and perineal pain, R27.8 Lack of coordination (muscle incoordination)  Precautions/Allergies:   Patient has no allergy information on record. TREATMENT PLAN:  Effective Dates: 2020 TO 2020 (90 days). Frequency/Duration: 1 time a week for 90 Day(s) MEDICAL/REFERRING DIAGNOSIS:  Unspecified dyspareunia [N94.10]  Pelvic floor dysfunction in female [M62.89]   DATE OF ONSET: chronic  REFERRING PHYSICIAN: Anna Krueger NP MD Orders: Evaluate and treat  Return MD Appointment: not scheduled     Pre-treatment Symptoms/Complaints: Had discontinued with progestrone due to increased vaginal bleeding. She is going through a pad every 2 hours about. Reports increased LBP and ankle swelling as she is walking around campus more. Has progressed up to #3 dilator (amielle). Pain: Initial: Pain Intensity 1: 1 Post Session:  1/10   Medications Last Reviewed:  2021  Updated Objective Findings:  increased tension and discomfort with dilator training today, #3 dilator   TREATMENT:   THERAPEUTIC EXERCISE: (15 minutes):  Exercises per grid below to improve mobility, strength and coordination. Required minimal verbal cues to promote proper body alignment, promote proper body posture and promote proper body breathing techniques. Progressed resistance, range and repetitions as indicated.   TE= therapeutic exercise, TA= therapeutic activity, MT= manual therapy, NE= neuro re-education   Date:  20 Date:  20 Date:  21 Date:  21 Date:  21 Date:  21   Activity/Exercise         PFM coordination Drops with manual palpation to reduce mm tension and pain Diaphragmatic breathing 5 minutes of DB w/ dilator inserted for PFM relaxation  To improve PFM excursion and parasympathetic activation W/ drops in order to improve relaxation during movement     Happy baby     3x30s 3x30s   Adductor stretch         Cat/cow     W/ pelvic circles x10 B    Child's pose   3-way with emphasis on elongation of L side body  3-way with emphasis on elongation of L side body    Piriformis stretch      3x30s, modified to reduce knee pain   Hamstring stretch         Patient education  Review of dilator use, frequency and duration  Tennis ball rolling of inner thigh- 10 minutes       HEP   DB 5 minutes daily; add 2 back stretches      Supine twist   To R to elongation L side body      Thoracic rotation     x10 B in quadruped    'tail wag'     x10     Stepper     8 minutes 8 minutes   Lumbar rotation      x20     MANUAL THERAPY: (25 minutes): Soft tissue mobilization was utilized and necessary because of the patient's painful spasm and restricted motion of soft tissue. Date Type Location Comments   2/1/2021 Internal assessment/treatment Via vaginal canal Single digit at all PFM layers, PIT tranverse perineal mm; 3 dilators    STM adductors Skin rolling, myofascial     STM Ischiorectal fossa anterior and posterior Glides in all directions; increased tenderness along inferior rectal branch, improved with reps   Not today STM vulva UG Diaphragm mobilization                     (Used abbreviations: MET - muscle energy technique; SCS- Strain counter strain; CTM-Connective tissue mobilizations; CR- Contract/relax; SP- Sustained pressure, TrP-Trigger point release, IASTM- Instrument assisted soft tissue mobilizations, TDN-Trigger point dry needling, PIT- Positional Inhibition Technique)    Pt gives verbal consent to internal vaginal assessment/treatment without chaperon present.     Treatment/Session Summary:    · Response to Treatment: Pt with slightly increased PFM tension and resistance noted with progression of penetration using 3# dilator, as compared to last visit. She does note more activity causing ankle swelling as she is now living [place] campus. We discussed how gait deviations and effects up the chain including LB tightness and PFM tension. She is doing well and progressing overall, however vaginal bleeding continues to be a limiting factor. She tends to do better, with less PFM overactivity when bleeding is lighter and increased PFM activity with heavier bleeding. She is weighing options at this time. She is frustrated with bleeding, which is understandable. · Communication/Consultation:  None today  · Equipment provided today:  None today  · Recommendations/Intent for next treatment session: Next visit will focus on manual therapy to lumbar spine, flexibility/spine mobility, manual therapy/dilator progression as tolerated.   Total Treatment Billable Duration: 25 minutes MT, 15 minutes TE  PT Patient Time In/Time Out  Time In: 1610  Time Out: [address], PT, DPT    Future Appointments   Date Time Provider Manda Gutierrez   2/8/2021  4:00 PM Jose Grady, DPT [facility]   2/15/2021  4:00 PM Nix, Mortimer Eagles, PT, DPT [facility]

## 2021-02-08 ENCOUNTER — HOSPITAL ENCOUNTER (OUTPATIENT)
Dept: PHYSICAL THERAPY | Age: 21
Discharge: HOME | End: 2021-02-08
Payer: COMMERCIAL

## 2021-02-08 PROCEDURE — 97140 MANUAL THERAPY 1/> REGIONS: CPT

## 2021-02-08 NOTE — PROGRESS NOTES
Antonio Nava  : 2000  Primary: [facility]*  Secondary:  [facility] at [facility]  [address] , [address], [place] [address]  [phone]   [phone]      OUTPATIENT PHYSICAL THERAPY: Daily Treatment Note 2021   Visit Count:  13  ICD-10: Treatment Diagnosis: R10.2 Pelvic and perineal pain, R27.8 Lack of coordination (muscle incoordination)  Precautions/Allergies:   Patient has no allergy information on record. TREATMENT PLAN:  Effective Dates: 2020 TO 2020 (90 days). Frequency/Duration: 1 time a week for 90 Day(s) MEDICAL/REFERRING DIAGNOSIS:  Unspecified dyspareunia [N94.10]  Pelvic floor dysfunction in female [M62.89]   DATE OF ONSET: chronic  REFERRING PHYSICIAN: Martina Jefferson NP MD Orders: Evaluate and treat  Return MD Appointment: not scheduled     Pre-treatment Symptoms/Complaints: Had noticed decreased in vaginal bleeding this week. Reports less dilator frequency due to other things going on. Pain: Initial: Pain Intensity 1: 0 Post Session:  0/10   Medications Last Reviewed:  2021  Updated Objective Findings:  #4 vaginismus dilator to full penetration with mild tenderness at L levator ani, improved with S/CS   TREATMENT:   THERAPEUTIC EXERCISE: (0 minutes):  Exercises per grid below to improve mobility, strength and coordination. Required minimal verbal cues to promote proper body alignment, promote proper body posture and promote proper body breathing techniques. Progressed resistance, range and repetitions as indicated.   TE= therapeutic exercise, TA= therapeutic activity, MT= manual therapy, NE= neuro re-education   Date:  20 Date:  20 Date:  21 Date:  21 Date:  21 Date:  21   Activity/Exercise         PFM coordination Drops with manual palpation to reduce mm tension and pain        Diaphragmatic breathing 5 minutes of DB w/ dilator inserted for PFM relaxation  To improve PFM excursion and parasympathetic activation W/ drops in order to improve relaxation during movement     Happy baby     3x30s 3x30s   Adductor stretch         Cat/cow     W/ pelvic circles x10 B    Child's pose   3-way with emphasis on elongation of L side body  3-way with emphasis on elongation of L side body    Piriformis stretch      3x30s, modified to reduce knee pain   Hamstring stretch         Patient education  Review of dilator use, frequency and duration  Tennis ball rolling of inner thigh- 10 minutes       HEP   DB 5 minutes daily; add 2 back stretches      Supine twist   To R to elongation L side body      Thoracic rotation     x10 B in quadruped    'tail wag'     x10     Stepper     8 minutes 8 minutes   Lumbar rotation      x20     MANUAL THERAPY: (30 minutes): Soft tissue mobilization was utilized and necessary because of the patient's painful spasm and restricted motion of soft tissue. Date Type Location Comments   2/8/2021 Internal assessment/treatment Via vaginal canal Single digit at all PFM layers, PIT tranverse perineal mm; 3/4 vaginismud dilators    STM adductors Skin rolling, myofascial     STM Ischiorectal fossa anterior and posterior Glides in all directions; increased tenderness along inferior rectal branch, improved with reps   Not today STM vulva UG Diaphragm mobilization                     (Used abbreviations: MET - muscle energy technique; SCS- Strain counter strain; CTM-Connective tissue mobilizations; CR- Contract/relax; SP- Sustained pressure, TrP-Trigger point release, IASTM- Instrument assisted soft tissue mobilizations, TDN-Trigger point dry needling, PIT- Positional Inhibition Technique)    Pt gives verbal consent to internal vaginal assessment/treatment without chaperon present. Treatment/Session Summary:    · Response to Treatment: Pt with slightly increased muscular re-activity during removal of dilator, with mild discomfort to a 3/10 during movement.  Progressed with dilator training and patient was able to tolerate full penetration of dilator #4 with minimal discomfort. She continues to have slightly increased tenderness of L levator ani group with improved with S/CS. We discussed that lumbar and hip mobility/flexibility will continue to be an important thin to management due to scoliosis. She does not have any vaginal bleeding today, which could potentially contribute to improve muscle relaxation and reduced pain as compared to last visit with heavy bleeding. · Communication/Consultation:  None today  · Equipment provided today:  None today  · Recommendations/Intent for next treatment session: Next visit will focus reassessment, D/C to HEP.   Total Treatment Billable Duration: 30 minutes MT  PT Patient Time In/Time Out  Time In: 5415  Time Out: [address] [place], PT, DPT    Future Appointments   Date Time Provider Manda Gutierrez   2/15/2021  4:00 PM Yves Farah, PT, DPT [facility]

## 2021-02-15 ENCOUNTER — HOSPITAL ENCOUNTER (OUTPATIENT)
Dept: PHYSICAL THERAPY | Age: 21
Discharge: HOME | End: 2021-02-15
Payer: COMMERCIAL

## 2021-02-15 NOTE — PROGRESS NOTES
Sally Arias  : 2000  Primary: [facility]*  Secondary:  [facility] at [place] [address], [address], [place] [address]  [phone]   [phone]      OUTPATIENT DAILY NOTE    NAME/AGE/GENDER: Sally Arias is a 21 y.o. female. DATE: 2/15/2021    MsMayra Gracy Barakatclair for today's appointment due to illness. Going to get COVID tested tomorrow and will get back with after.     Helen Mondragon, PT, DPT

## 2021-02-18 ENCOUNTER — HOSPITAL ENCOUNTER (OUTPATIENT)
Dept: PHYSICAL THERAPY | Age: 21
Discharge: HOME | End: 2021-02-18
Payer: COMMERCIAL

## 2021-02-18 PROCEDURE — 97140 MANUAL THERAPY 1/> REGIONS: CPT

## 2021-02-18 PROCEDURE — 97110 THERAPEUTIC EXERCISES: CPT

## 2021-02-18 NOTE — THERAPY RECERTIFICATION
Mone Lynn : 2000 Primary: [employer] [facility]* Secondary:  [facility] [address], [place] [address] [phone]   [phone] OUTPATIENT PHYSICAL THERAPY:Recertification and Discharge Summary 2021 ICD-10: Treatment Diagnosis: R10.2 Pelvic and perineal pain, R27.8 Lack of coordination (muscle incoordination) Precautions/Allergies:  
Patient has no allergy information on record. TREATMENT PLAN: 
Effective Dates: 2020 to 3/27/21 (90 days). Frequency/Duration: 1 time a week for 90 Day(s) MEDICAL/REFERRING DIAGNOSIS: 
Unspecified dyspareunia [N94.10] Pelvic floor dysfunction in female [M62.89] DATE OF ONSET: chronic REFERRING PHYSICIAN: Shea Em NP MD Orders: Evaluate and treat Return MD Appointment: not scheduled 21 REASSESSMENT/DISCHARGE: Ms. Rosario Rodney has been seen in skilled PT from 20 to 21. Patient has attended 14 out of 20 scheduled visits, with 6 cancellation(s) and 0 no shows. Some delays/breaks in treatment due to ankle surgery and COVID exposures. Treatment has emphasized manual therapy, dilator training, mobility/flexibility and sexual health education. Patient responded well to therapy, with improvements in vaginal pain, tolerance of vaginal penetration during intercourse and tampon use. She continues to have mild superficial pain with intercourse and speculum examination as well as persistent vaginal bleeding. She is independent with HEP and will continue as she works with GYN to determine cause and treatment for vaginal bleeding. I do believe this is playing a role if PFD and remaining pain. I encouraged pt to continue with HEP while navigating options with GYN in order to maintain progress. Pt has achieved goals as indicated below and all questions have been answered to their satisfaction. Pt was invited to call with any further questions or concerns as needed. INITIAL ASSESSMENT:  Ms. Nohemy Jett presents with significant pelvic floor muscle (PFM) over activity and lack of coordination, resulting in pain with intercourse, tampon use and speculum examinations. Concordant pain with palpation of PFM at all layers, with improvements in pain intensity with strain/counterstrain techniques. I believe she will benefit from skilled PT, with an emphasis on manual therapy and muscle retraining, coordination and sexual health education to decreased pain and improve her ability to participate in intercourse and GYN exams. She is pleasant, motivated and eager to improve. GOALS: (Goals have been discussed and agreed upon with patient.) Short-Term Functional Goals: Time Frame: 6 weeks 1. Pt will demonstrate I with basic PFM HEP to improve awareness, coordination, and timing of PFM. (MET 2/18/21) 2. Pt with demonstrate normal voluntary relaxation of the pelvic floor muscle group to improve pelvic floor ROM and tolerate pain free PFM examination. (MET 2/18/21) 3. Pt will demonstrate ability to perform diaphragmatic breathing in multiple positions to assist in pelvic floor tension reduction. (MET 2/18/21) Discharge Goals: Time Frame: 12 weeks 1. Pt will be independent in a home dilator and/or graded exposure program, to be performed daily, in order to reduce pain and improved tolerance of tampon use, gynecological screening, and/or sexual intercourse. (MET 2/18/21) 2. Pt will reports decrease pain intensity by 50% with intercourse. (MET 2/18/21) 3. Pt will demonstrate independence with a home exercise program for aerobic conditioning, core stabilization, and general mobility for independent management of symptoms. (MET 2/18/21) OUTCOME MEASURE:  
Tool Used: Pelvic Floor Impact Questionnaireshort form 7 (PFIQ-7) Score:  Initial: 9/28/20 · Bladder or Urine: 0/100 · Bowel or Rectum: 0/100 · Vagina or Pelvis: 29/100 Most Recent: 2/18/21 · Bladder or Urine: 0 · Bowel or Rectum: 0 
· Vagina or Pelvis: 10/100 Interpretation of Score: Each of the 7 sections is scored on a scale from 0-3; 0 representing \"Not at all\", 3 representing \"Quite a bit\". The mean value is taken from all the answered items, then multiplied by 100 to obtain the scale score, ranging from 0-100. This process is repeated for each column representing bowel, bladder, and pelvic pain. History of Injury/Illness (Reason for Referral): Ms. Julian Lawson is a 22 yo female referred to pelvic floor physical therapy (PFPT) by Kathya Kim NP 2/2 dyspareunia and pelvic floor muscle dysfunction. Pt reports that symptoms began at the age of 25. She reports a history of pain with intercourse, tampon use and speculum exams. She initially attributed pain to anxiety. She states that she has been with her current partner for some time and continues to have pain. She does have an IUD and was concerned that this might be the source of her pain. She states that she had this checked by her GYN and had STI testing as well. She also has a history of vaginal bleeding, which she states still has not been figured out. She has never had PFPT for this. 2/18/21 update: Pt is doing well with decreased pain during intercourse. She is now able to use a tampon without pain as well, however does not use often because of amount of bleeding and not wanting to have one placed constantly. She came off of progesterone 3-4 weeks ago. Continues to have vaginal bleeding but less frequent for now. She is deciding how she would like to proceed with this. Using 3rd-4th vaginal dilator independently. Urinary: Frequency 4-5 x/day, 0-1 x/night. Negative for stress urinary incontinence, urge urinary incontinence, urinary urgency, urinary frequency, incomplete emptying, urinary hesitancy/intermittency, dysuria, hematuria, nocturia and nocturnal enuresis. Pt does not use pads for urinary protection; 0 pads per day (PPD). Fluid intake: 60 oz water/day; bladder irritants include: 1-2c coffee. Pt does not limit fluid intake due to bladder control. Bowel: Frequency 1x/day. Negative for pain with bowel movement, pushing/straining with bowel movement, fecal incontinence, constipation and incomplete emptying. Pt does not use pads for bowel protection; 0 pads per day (PPD). [place] stool type: 4. Use of stool softeners or laxatives? NO Sexual: Pt is sexually active with male partners. Positive for dyspareunia. This has improved since starting therapy. Pain is superficial. Contraception/birth control: Mirena IUD. Pelvic Organ Prolapse/Pelvic Pain: Location: vaginal. Worse with intercourse, GYN/speculum examinations. Relieved with \"slow down\" during intercourse. Max pain 3/10. Min pain 0/10. OB History: Number of pregnancies: 0 Number of vaginal deliveries: 0 Number of c-sections: 0. 
 
UPDATED OBJECTIVE FINDINGS:   
External Observations: ? Voluntary contraction: [] absent     [x] present ? Involuntary contraction: [] absent     [x] present ? Involuntary relaxation: [] absent     [x] present ? Perineal descent at rest: [x] absent     [] present ? Perineal descent with bearing: [] absent     [x] present 
? Skin integrity: WNL, mild vaginal bleeding present today Pelvic Floor Muscle (PFM) Assessment: ? Vaginal vault size: [] decreased     [] increased     [x] WNL ? Muscle volume: [] decreased     [x] WNL     [] Defect ? PFM tension: [] decreased     [] increased     [x] WNL Contraction ability: 
Voluntary contraction: [] absent     [x] weak     [] moderate      [] strong Voluntary relaxation: [] absent     [] partial     [x] complete MMT: 2/5 PFM endurance: not tested today Repetitions of endurance contractions: not tested today Number of quick contractions in 10 seconds: not tested today Quality of contractions: fair with reduced ROM 2/2 weakness Overflow: [x] absent     [] min     [] mod     [] severe Tissue laxity test: Anterior wall: [] minimum     [] moderate     [] severe      [x] WNL Posterior wall: [] minimum     [] moderate     [] severe      [x] WNL Palpation: via vaginal canal 
Superficial Pelvic Floor Musculature (PFM): Tender? Intermediate PFM Tender? Deep PFM Tender? Superficial Transverse Perineal [] Right  [] Left  []N/T Deep Transverse Perineal [] Right  [] Left  []N/T Puborectalis [] Right  [] Left  []N/T Ischiocavernosus [] Right  [] Left  []N/T Compressor Urethra [] Right  [] Left  []N/T Pubococcygeus [] Right  [] Left  []N/T Bulbocavernosus [] Right  [] Left  []N/T   Ileococcygeus [] Right  [] Left  []N/T  
    Obturator Internus [] Right  [] Left  []N/T Coccygeus [] Right  [] Left  []N/T Special tests/Movement screens:  
Q-tip vulvar test: non tender with q-tip testing today Total Duration: PT Patient Time In/Time Out Time In: 9813 Time Out: 1005 Regarding Markus Wilson's therapy, I certify that the treatment plan above will be carried out by a therapist or under their direction. Thank you for this referral, Luis Hernández, PT, DPT Referring Physician Signature: Renae Vicente NP _______________________________ Date _____________

## 2021-02-18 NOTE — PROGRESS NOTES
Macy Dach  : 2000  Primary: Candis Luna [facility]*  Secondary:  [facility] at [facility]  [address] , [address], [place] [address]  [phone]   [phone]      OUTPATIENT PHYSICAL THERAPY: Daily Treatment Note 2021   Visit Count:  14  ICD-10: Treatment Diagnosis: R10.2 Pelvic and perineal pain, R27.8 Lack of coordination (muscle incoordination)  Precautions/Allergies:   Patient has no allergy information on record. TREATMENT PLAN:  Effective Dates: 2020 to 3/27/21 (90 days). Frequency/Duration: 1 time a week for 90 Day(s) MEDICAL/REFERRING DIAGNOSIS:  Unspecified dyspareunia [N94.10]  Pelvic floor dysfunction in female [M62.89]   DATE OF ONSET: chronic  REFERRING PHYSICIAN: Mikey Liu NP MD Orders: Evaluate and treat  Return MD Appointment: not scheduled     Pre-treatment Symptoms/Complaints: Doing well, has been sick with strep throat the past week so has not been doing HEP at much. She feels good about today being her last visit and will continue with HEP while deciding which direction she would like to go with medical management of vaginal bleeding. Pain: Initial: Pain Intensity 1: 0 Post Session:  0/10   Medications Last Reviewed:  2021  Updated Objective Findings:  See discharge assessment   TREATMENT:   THERAPEUTIC EXERCISE: (10 minutes):  Exercises per grid below to improve mobility and coordination. Required minimal verbal cues to promote proper body alignment, promote proper body posture and promote proper body breathing techniques. Progressed range and repetitions as indicated.   TE= therapeutic exercise, TA= therapeutic activity, MT= manual therapy, NE= neuro re-education   Date:  20 Date:  20 Date:  21 Date:  21 Date:  21 Date:  21 Date:  21   Activity/Exercise          PFM coordination Drops with manual palpation to reduce mm tension and pain      Drops with manual palpation Diaphragmatic breathing 5 minutes of DB w/ dilator inserted for PFM relaxation  To improve PFM excursion and parasympathetic activation W/ drops in order to improve relaxation during movement      Happy baby     3x30s 3x30s    Adductor stretch          Cat/cow     W/ pelvic circles x10 B     Child's pose   3-way with emphasis on elongation of L side body  3-way with emphasis on elongation of L side body     Piriformis stretch      3x30s, modified to reduce knee pain    Hamstring stretch          Patient education  Review of dilator use, frequency and duration  Tennis ball rolling of inner thigh- 10 minutes        HEP   DB 5 minutes daily; add 2 back stretches    Review of HEP, symptom change, etc for continued management   Supine twist   To R to elongation L side body       Thoracic rotation     x10 B in quadruped     'tail wag'     x10      Stepper     8 minutes 8 minutes    Lumbar rotation      x20      MANUAL THERAPY: (45 minutes): Soft tissue mobilization was utilized and necessary because of the patient's restricted motion of soft tissue. Date Type Location Comments   2/18/2021 Internal assessment/treatment Via vaginal canal Single digit at all PFM layers, PIT tranverse perineal mm; 3/4 amielle dilators    STM adductors Skin rolling, myofascial     STM Ischiorectal fossa anterior and posterior Glides in all directions; increased tenderness along inferior rectal branch, improved with reps   (Used abbreviations: MET - muscle energy technique; SCS- Strain counter strain; CTM-Connective tissue mobilizations; CR- Contract/relax; SP- Sustained pressure, TrP-Trigger point release, IASTM- Instrument assisted soft tissue mobilizations, TDN-Trigger point dry needling, PIT- Positional Inhibition Technique)    Pt gives verbal consent to internal vaginal assessment/treatment without chaperon present.     Treatment/Session Summary:    · Response to Treatment: Ms. Rosie Jones has been seen in skilled PT from 9/28/20 to 2/18/21. Patient has attended 14 out of 20 scheduled visits, with 6 cancellation(s) and 0 no shows. Some delays/breaks in treatment due to ankle surgery and COVID exposures. Treatment has emphasized manual therapy, dilator training, mobility/flexibility and sexual health education. Patient responded well to therapy, with improvements in vaginal pain, tolerance of vaginal penetration during intercourse and tampon use. She continues to have mild superficial pain with intercourse and speculum examination as well as persistent vaginal bleeding. She is independent with HEP and will continue as she works with GYN to determine cause and treatment for vaginal bleeding. I do believe this is playing a role if PFD and remaining pain. I encouraged pt to continue with HEP while navigating options with GYN in order to maintain progress. Pt has achieved goals as indicated below and all questions have been answered to their satisfaction. Pt was invited to call with any further questions or concerns as needed.   · Communication/Consultation:  None today  · Equipment provided today:  None today  Total Treatment Billable Duration: 45 minutes MT, 10 minute TE  PT Patient Time In/Time Out  Time In: 0905  Time Out: Lucian Torrez [address], PT, DPT

## 2024-09-13 NOTE — PROGRESS NOTES
Contacted patient and informed her that her procedure was denied by insurance. Informed patient that the only option would be to appeal the decision. Patient asked the out of pocket cost. Writer directed patient to financial services for that information. Patient declined the number. Patient stated she will consider her options and to cancel procedure at this time. Patient had no further questions at this time.    Kelsy Aguirre  : 2000  Primary: [facility]  Secondary:  [facility]  [address] , [address], [place] [address]  [phone]   [phone]      OUTPATIENT PHYSICAL THERAPY: Daily Treatment Note 2020   Visit Count:  4  ICD-10: Treatment Diagnosis: R10.2 Pelvic and perineal pain, R27.8 Lack of coordination (muscle incoordination)  Precautions/Allergies:   Patient has no allergy information on record. TREATMENT PLAN:  Effective Dates: 2020 TO 2020 (90 days). Frequency/Duration: 1 time a week for 90 Day(s) MEDICAL/REFERRING DIAGNOSIS:  Unspecified dyspareunia [N94.10]  Pelvic floor dysfunction in female [M62.89]   DATE OF ONSET: chronic  REFERRING PHYSICIAN: Selena Messer NP MD Orders: Evaluate and treat  Return MD Appointment: not scheduled     Pre-treatment Symptoms/Complaints:  Pt doing well with exercises. Does not increased difficulty with relaxation after ~6 reps of drops. Pain: Initial: Pain Intensity 1: 2  Post Session:  -3/10   Medications Last Reviewed:  2020  Updated Objective Findings:  None Today   TREATMENT:   THERAPEUTIC EXERCISE: (15 minutes):  Exercises per grid below to improve mobility and coordination. Required moderate verbal and tactile cues to promote proper body breathing techniques and mm relaxation, reduce burning. Progressed range and repetitions as indicated.   TE= therapeutic exercise, TA= therapeutic activity, MT= manual therapy, NE= neuro re-education   Date:  20 Date:  10/5/20 Date:  20 Date:  20   Activity/Exercise Parameters Parameters Parameters    PFM coordination PFM drops to improve coordination and ROM- 5 minutes PFM drops w/ manual palpation  Drops w/ manual therapy to reduce mm tension   Diaphragmatic breathing PNS activation and PFM ROM- 5 minutes   DB w/ manual therapy in order to reduce tension and improve ROM   Happy baby  3 minutes reviewed    Adductor stretch  3 minutes reviewed    Cat/cow   reviewed    Child's pose   reviewed    Patient education    Role and benefits for dilators; information for review   HEP   Drops 3x10, DB and flexibility 2x/day      MANUAL THERAPY: (40 minutes): Soft tissue mobilization was utilized and necessary because of the patient's painful spasm and restricted motion of soft tissue. Date Type Location Comments   11/9/2020 Internal assessment/treatment Via vaginal canal Single digit at all PFM layers, PIT tranverse perineal mm    STM adductors Skin rolling, myofascial    Not today STM Ischiorectal fossa Glides in all directions    STM vulva UG Diaphragm mobilization                     (Used abbreviations: MET - muscle energy technique; SCS- Strain counter strain; CTM-Connective tissue mobilizations; CR- Contract/relax; SP- Sustained pressure, TrP-Trigger point release, IASTM- Instrument assisted soft tissue mobilizations, TDN-Trigger point dry needling)    Pt gives verbal consent to internal vaginal assessment/treatment without chaperon present. Treatment/Session Summary:    · Response to Treatment: Pt with continues reproduction of vulvar/vaginal burning with manual therapy. With UG diaphragm techniques she notes some increased burning that improved with time. There is noted muscle reactivity with burning and stretch sensation B. PFM ROM limited due to over activity. She tolerated manual therapy well today with more variability of symptoms. We discussed role and use of dilators independently in order to improve progression. Pt was provided information regarding dilators; will discuss further at next session. · Communication/Consultation:  None today  · Equipment provided today:  None today  · Recommendations/Intent for next treatment session: Next visit will focus on manual therapy, biofeedback, flexibility.   Total Treatment Billable Duration: 10 minutes TE, 45 minutes MT  PT Patient Time In/Time Out  Time In: 1300  Time Out: 503 [address] [place], [place], DPT    Future Appointments   Date Time Provider Manda Dawsoni   11/16/2020  1:00 PM [facility]   11/30/2020  1:00 PM Chacho Baca PT, DPT [facility]   12/9/2020  2:00 PM Chacho Baca PT, DPT [facility]   12/14/2020  1:00 PM Chacho Baca PT, DPT [facility]   12/21/2020  1:00 PM Chacho Baca PT, DPT [facility]   1/4/2021  1:00 PM Rasheed Farah PT, DPT [facility]